# Patient Record
Sex: MALE | Race: WHITE | Employment: FULL TIME | URBAN - NONMETROPOLITAN AREA
[De-identification: names, ages, dates, MRNs, and addresses within clinical notes are randomized per-mention and may not be internally consistent; named-entity substitution may affect disease eponyms.]

---

## 2024-01-04 LAB — PROSTATE SPECIFIC ANTIGEN: 5.5 NG/ML

## 2024-01-17 ENCOUNTER — OFFICE VISIT (OUTPATIENT)
Dept: UROLOGY | Age: 59
End: 2024-01-17
Payer: COMMERCIAL

## 2024-01-17 VITALS — HEIGHT: 73 IN | WEIGHT: 198 LBS | RESPIRATION RATE: 16 BRPM | BODY MASS INDEX: 26.24 KG/M2

## 2024-01-17 DIAGNOSIS — R97.20 ELEVATED PSA: ICD-10-CM

## 2024-01-17 DIAGNOSIS — N40.0 BENIGN PROSTATIC HYPERPLASIA WITHOUT LOWER URINARY TRACT SYMPTOMS: Primary | ICD-10-CM

## 2024-01-17 PROCEDURE — 99204 OFFICE O/P NEW MOD 45 MIN: CPT | Performed by: UROLOGY

## 2024-01-17 RX ORDER — SILODOSIN 8 MG/1
8 CAPSULE ORAL EVERY EVENING
COMMUNITY

## 2024-01-17 NOTE — PROGRESS NOTES
Dr. Rey Marin MD  [facility]  Urology Clinic  New Patient Visit      Patient:  Cosme Hammond  YOB: 1965  Date: 1/17/2024    HISTORY OF PRESENT ILLNESS:   The patient is a 58 y.o. male who presents today for evaluation of the following problem(s): elevated PSA of 5.5 in early 2024, and BPH on rapaflo (but has stuffy nose). AUA-SS of 26 on Rapaflo.     Overall the problem(s) : are worsening.  Associated Symptoms: No dysuria, gross hematuria.  Pain Severity: 0/10    Summary of old records:   None    Imaging/Labs reviewed during today's visit:  I have independently reviewed and verified the following films during today's visit.  PSA 5.5    Last several PSA's:  Lab Results   Component Value Date    PSA 5.50 01/04/2024       Last total testosterone:  No results found for: \"TESTOSTERONE\"    Urinalysis today:  No results found for this visit on 01/17/24.      Last BUN and creatinine:  No results found for: \"BUN\"  No results found for: \"CREATININE\"    Imaging Reviewed during this Office Visit:   (results were independently reviewed by physician and radiology report verified)    PAST MEDICAL, FAMILY AND SOCIAL HISTORY:  No past medical history on file.  Past Surgical History:   Procedure Laterality Date    COLONOSCOPY  2016    HERNIA REPAIR      KNEE SURGERY Right     VASECTOMY       No family history on file.  Outpatient Medications Marked as Taking for the 1/17/24 encounter (Office Visit) with Rey Marin MD   Medication Sig Dispense Refill    Metoprolol Succinate 100 MG CS24 Take by mouth daily      silodosin (RAPAFLO) 8 MG CAPS Take 1 capsule by mouth every evening      Cholecalciferol (VITAMIN D-3 PO) Take by mouth daily         Patient has no known allergies.  Social History     Tobacco Use   Smoking Status Never   Smokeless Tobacco Never       Social History     Substance and Sexual Activity   Alcohol Use None       REVIEW OF SYSTEMS:  Constitutional: negative  Eyes: negative  Respiratory:

## 2024-02-12 ENCOUNTER — HOSPITAL ENCOUNTER (OUTPATIENT)
Dept: MRI IMAGING | Age: 59
Discharge: HOME | End: 2024-02-12
Attending: UROLOGY
Payer: COMMERCIAL

## 2024-02-12 DIAGNOSIS — R97.20 ELEVATED PSA: ICD-10-CM

## 2024-02-12 DIAGNOSIS — N40.0 BENIGN PROSTATIC HYPERPLASIA WITHOUT LOWER URINARY TRACT SYMPTOMS: ICD-10-CM

## 2024-02-12 LAB — POC CREATININE WHOLE BLOOD: 1 MG/DL (ref 0.5–1.2)

## 2024-02-12 PROCEDURE — 72197 MRI PELVIS W/O & W/DYE: CPT

## 2024-02-12 PROCEDURE — 6360000004 HC RX CONTRAST MEDICATION: Performed by: UROLOGY

## 2024-02-12 PROCEDURE — 82565 ASSAY OF CREATININE: CPT

## 2024-02-12 PROCEDURE — A9579 GAD-BASE MR CONTRAST NOS,1ML: HCPCS | Performed by: UROLOGY

## 2024-02-12 PROCEDURE — 76377 3D RENDER W/INTRP POSTPROCES: CPT

## 2024-02-12 RX ADMIN — GADOTERIDOL 20 ML: 279.3 INJECTION, SOLUTION INTRAVENOUS at 19:10

## 2024-02-21 ENCOUNTER — OFFICE VISIT (OUTPATIENT)
Dept: UROLOGY | Age: 59
End: 2024-02-21
Payer: COMMERCIAL

## 2024-02-21 VITALS — WEIGHT: 198 LBS | BODY MASS INDEX: 26.24 KG/M2 | HEIGHT: 73 IN | RESPIRATION RATE: 16 BRPM

## 2024-02-21 DIAGNOSIS — N40.0 BENIGN PROSTATIC HYPERPLASIA WITHOUT LOWER URINARY TRACT SYMPTOMS: Primary | ICD-10-CM

## 2024-02-21 DIAGNOSIS — R97.20 ELEVATED PSA: ICD-10-CM

## 2024-02-21 PROCEDURE — 99214 OFFICE O/P EST MOD 30 MIN: CPT | Performed by: UROLOGY

## 2024-02-21 NOTE — PROGRESS NOTES
Dr. Rey Marin MD  [facility]  Urology Clinic  New Patient Visit      Patient:  Cosme Hammond  YOB: 1965  Date: 2/21/2024    HISTORY OF PRESENT ILLNESS:   The patient is a 58 y.o. male who presents today for evaluation of the following problem(s): elevated PSA of 5.5 in early 2024, and BPH on rapaflo (but has stuffy nose). AUA-SS of 26 on Rapaflo.     Overall the problem(s) : are worsening.  Associated Symptoms: No dysuria, gross hematuria.  Pain Severity: 0/10    Summary of old records:   MRI 2024 50g and PIRADS-2 MRI.     Imaging/Labs reviewed during today's visit:  I have independently reviewed and verified the following films during today's visit.  Prostate MRI    Last several PSA's:  Lab Results   Component Value Date    PSA 5.50 01/04/2024       Last total testosterone:  No results found for: \"TESTOSTERONE\"    Urinalysis today:  No results found for this visit on 02/21/24.      Last BUN and creatinine:  No results found for: \"BUN\"  No results found for: \"CREATININE\"    Imaging Reviewed during this Office Visit:   (results were independently reviewed by physician and radiology report verified)    PAST MEDICAL, FAMILY AND SOCIAL HISTORY:  No past medical history on file.  Past Surgical History:   Procedure Laterality Date    COLONOSCOPY  2016    HERNIA REPAIR      KNEE SURGERY Right     VASECTOMY       No family history on file.  Outpatient Medications Marked as Taking for the 2/21/24 encounter (Office Visit) with Rey Marin MD   Medication Sig Dispense Refill    Metoprolol Succinate 100 MG CS24 Take by mouth daily      silodosin (RAPAFLO) 8 MG CAPS Take 1 capsule by mouth every evening      Cholecalciferol (VITAMIN D-3 PO) Take by mouth daily         Patient has no known allergies.  Social History     Tobacco Use   Smoking Status Never   Smokeless Tobacco Never       Social History     Substance and Sexual Activity   Alcohol Use None       REVIEW OF SYSTEMS:  Constitutional:

## 2024-02-22 ENCOUNTER — TELEPHONE (OUTPATIENT)
Dept: UROLOGY | Age: 59
End: 2024-02-22

## 2024-02-23 ENCOUNTER — TELEPHONE (OUTPATIENT)
Dept: UROLOGY | Age: 59
End: 2024-02-23

## 2024-02-23 DIAGNOSIS — Z01.818 PRE-OP TESTING: ICD-10-CM

## 2024-02-23 DIAGNOSIS — R97.20 ELEVATED PSA: ICD-10-CM

## 2024-02-23 DIAGNOSIS — N40.0 BENIGN PROSTATIC HYPERPLASIA WITHOUT LOWER URINARY TRACT SYMPTOMS: Primary | ICD-10-CM

## 2024-02-23 NOTE — TELEPHONE ENCOUNTER
SURGERY SCHEDULING FORM   [address] [address]      Phone *[phone] *[phone]   Surgical Scheduling Direct Line Phone *[phone] Fax *[phone]      Cosme Hammond 1965 male    [address] [place] [address]   Marital Status:          Home Phone: [phone]      Cell Phone:    Telephone Information:   Mobile [phone]          Surgeon: Dr. Marin Surgery Date: 4/10/2024   Time: 12:30pm    Procedure: Cystoscopy, Greenlight Photo Vaporization of Prostate    Diagnosis: BPH     Important Medical History:  In [facility]    Special Inst/Equip: LEONARDO Sylvester conf# [id number]    CPT Codes:    15494  Latex Allergy: No     Cardiac Device:  No    Anesthesia:  General          Admission Type:  Same Day                        Admit Prior to Day of Surgery: No    Case Location:  Main OR            Preadmission Testing:  Phone Call          PAT Date and Time:______________________________________________________    PAT Confirmation #: ______________________________________________________    Post Op Visit: ___________________________________________________________    Need Preop Cardiac Clearance: No    Does Patient have Cardiologist/physician?     none    Surgery Confirmation #: __________________________________________________    : ________________________   Date: __________________________     Insurance Company Name: [facility]

## 2024-02-23 NOTE — TELEPHONE ENCOUNTER
Patient is scheduled for surgery with  on 4/10/2024. Surgery consent to be done on arrival. Patient to do pre op EKG NOW; DO URINE CULTURE AND FASTING LABS ON 3/27/2024; ORDERS MAILED TO PATIENT. Surgery instructions mailed to the patient.     Patient informed an adult over the age of 18 must be with them at the time of surgery, upon discharge and at home for 24 hours after the procedure.    [facility] CONF#[id number] PER JOHN

## 2024-02-23 NOTE — TELEPHONE ENCOUNTER
DO NOT TAKE  FISH OIL, MOBIC, IBUPROFEN, MOTRIN-LIKE DRUGS AND ANY MULTIVITAMINS OR OVER THE COUNTER SUPPLEMENTS 14 DAYS PRIOR TO SURGERY.    MUST HAVE AN ADULT OVER THE AGE OF 18 WITH YOU AT THE TIME OF THE DISCHARGE AND WITH YOU AT HOME AFTER THE PROCEDURE FOR 24 HOURS        Cosme Hammond 1965     Surgical Physician: Dr. Marin      You have been scheduled for the procedure marked below:      Surgery: Cystoscopy, Greenlight Photo Vaporization of Prostate         Date: 4/10/2024     Anesthesia: Anesthesiologist - General     Place of Service: [facility] Second Floor Same Day Surgery         Arrive to same day surgery at:  10:30am  (Surgery time is subject to change)      INSTRUCTIONS AS MARKED BELOW:    1.  DO NOT eat or drink anything after midnight before surgery.  2.  We prefer you shower or bathe with an antibacterial soap (Dial) the morning of surgery.  3  Please bring a current medication list, photo ID and insurance card(s) with you  4. Okay to take Tylenol  5. Take blood pressure or heart medication as directed, if taken in the morning take with a small sip of water  6.The office will call you in 1-2 days after your procedure to schedule a follow up.      DATE SENSITIVE PRE OP TESTING:    TO AVOID YOUR SURGERY BEING CANCELLED DO ON THE DATE LISTED *WALK IN *NO APPOINTMENT.      DO EKG NOW; ORDERS INCLUDED    DO URINE CULTURE AND FASTING LABS ON 3/27/2024        Date: 2/23/2024

## 2024-03-22 ENCOUNTER — PREP FOR PROCEDURE (OUTPATIENT)
Dept: UROLOGY | Age: 59
End: 2024-03-22

## 2024-03-26 LAB
BASOPHILS ABSOLUTE: 0 /ΜL
BASOPHILS RELATIVE PERCENT: 0.4 %
BUN BLDV-MCNC: 18 MG/DL
CALCIUM SERPL-MCNC: 9.4 MG/DL
CHLORIDE BLD-SCNC: 105 MMOL/L
CO2: 26 MMOL/L
CREAT SERPL-MCNC: 0.9 MG/DL
EGFR: >=90
EOSINOPHILS ABSOLUTE: 0.1 /ΜL
EOSINOPHILS RELATIVE PERCENT: 1.5 %
GLUCOSE BLD-MCNC: 80 MG/DL
HCT VFR BLD CALC: 45.9 % (ref 41–53)
HEMOGLOBIN: 15 G/DL (ref 13.5–17.5)
LYMPHOCYTES ABSOLUTE: 1.2 /ΜL
LYMPHOCYTES RELATIVE PERCENT: 15.9 %
MCH RBC QN AUTO: 30.2 PG
MCHC RBC AUTO-ENTMCNC: 32.7 G/DL
MCV RBC AUTO: 92.5 FL
MONOCYTES ABSOLUTE: 0.6 /ΜL
MONOCYTES RELATIVE PERCENT: 7.7 %
NEUTROPHILS ABSOLUTE: 5.5 /ΜL
NEUTROPHILS RELATIVE PERCENT: 74.2 %
PDW BLD-RTO: 12.7 %
PLATELET # BLD: 215 K/ΜL
PMV BLD AUTO: 10.1 FL
POTASSIUM SERPL-SCNC: 4.3 MMOL/L
RBC # BLD: 4.96 10^6/ΜL
SODIUM BLD-SCNC: 140 MMOL/L
WBC # BLD: 7.4 10^3/ML

## 2024-04-01 RX ORDER — SODIUM CHLORIDE 0.9 % (FLUSH) 0.9 %
5-40 SYRINGE (ML) INJECTION PRN
Status: CANCELLED | OUTPATIENT
Start: 2024-04-01

## 2024-04-01 RX ORDER — SODIUM CHLORIDE 0.9 % (FLUSH) 0.9 %
5-40 SYRINGE (ML) INJECTION EVERY 12 HOURS SCHEDULED
Status: CANCELLED | OUTPATIENT
Start: 2024-04-01

## 2024-04-01 RX ORDER — SODIUM CHLORIDE 9 MG/ML
INJECTION, SOLUTION INTRAVENOUS PRN
Status: CANCELLED | OUTPATIENT
Start: 2024-04-01

## 2024-04-02 NOTE — PROGRESS NOTES
PAT call attempted, patient unavailable, left message to please call us back at your earliest convenience; [phone]

## 2024-04-02 NOTE — FLOWSHEET NOTE
Follow all instructions given by your physician  Do not eat or drink anything after midnight prior to surgery(includes water, chewing gum, mints and ice chips)  Sips of water am of surgery with allowed medications  Do not use chewing tobacco after midnight prior to surgery  May brush teeth do not swallow water  Do not smoke, drink alcoholic beverages or use any illicit drugs for 24 hours prior to surgery  Bring insurance info and photo ID  Bring pertinent paperwork with you from Doctor or surgeons's office  Wear clean comfortable, loose-fitting clothing  No make-up, nail polish, jewelry, piercings, or contact lenses to be worn day of surgery  No glue on dentures morning of surgery; you will be asked to remove them for surgery. Case for glasses.  Shower the night before and the morning of surgery with cleansing soap provided or a liquid antibacterial soap, dry with new fresh clean towel after each shower, no lotions, creams or powder.  Clean sheets and pillowcase on bed night before surgery  Bring medications in original bottles, Bring rescue inhalers with you  Bring CPAP/BIPAP machine if you have one ( you may be charged if one is needed in recovery room )  CPAP machine no    Pacemaker no    Our pharmacy has a Meds to Beds program where they will deliver any new prescriptions you may have to your room before you leave. Our Pharmacy will clear it through your insurance; for example (same co pay). This enables you to take your new RX as soon as you need when you get home and avoids stop/wait delays on the way home.  Please have a form of payment with you and have someone designated as your Pharmacy contact with their phone # as you may not feel well or still be under the influence of anesthesia.    Please refer to the SSI-Surgical Site Infection Flyer you hopefully received in the mail-together we can prevent infections; signs and symptoms reviewed.  When discharged be sure you understand how to care for your

## 2024-04-10 ENCOUNTER — HOSPITAL ENCOUNTER (OUTPATIENT)
Age: 59
Setting detail: OUTPATIENT SURGERY
Discharge: HOME | End: 2024-04-10
Attending: UROLOGY | Admitting: UROLOGY
Payer: COMMERCIAL

## 2024-04-10 ENCOUNTER — ANESTHESIA EVENT (OUTPATIENT)
Dept: OPERATING ROOM | Age: 59
End: 2024-04-10
Payer: COMMERCIAL

## 2024-04-10 ENCOUNTER — ANESTHESIA (OUTPATIENT)
Dept: OPERATING ROOM | Age: 59
End: 2024-04-10
Payer: COMMERCIAL

## 2024-04-10 VITALS
DIASTOLIC BLOOD PRESSURE: 87 MMHG | WEIGHT: 186.8 LBS | HEIGHT: 73 IN | BODY MASS INDEX: 24.76 KG/M2 | OXYGEN SATURATION: 96 % | HEART RATE: 62 BPM | SYSTOLIC BLOOD PRESSURE: 144 MMHG | RESPIRATION RATE: 16 BRPM | TEMPERATURE: 97.1 F

## 2024-04-10 DIAGNOSIS — G89.18 POST-OPERATIVE PAIN: Primary | ICD-10-CM

## 2024-04-10 PROCEDURE — 2580000003 HC RX 258: Performed by: UROLOGY

## 2024-04-10 PROCEDURE — 6360000002 HC RX W HCPCS: Performed by: UROLOGY

## 2024-04-10 PROCEDURE — 2709999900 HC NON-CHARGEABLE SUPPLY: Performed by: UROLOGY

## 2024-04-10 PROCEDURE — 7100000000 HC PACU RECOVERY - FIRST 15 MIN: Performed by: UROLOGY

## 2024-04-10 PROCEDURE — 3600000013 HC SURGERY LEVEL 3 ADDTL 15MIN: Performed by: UROLOGY

## 2024-04-10 PROCEDURE — 7100000001 HC PACU RECOVERY - ADDTL 15 MIN: Performed by: UROLOGY

## 2024-04-10 PROCEDURE — 3600000003 HC SURGERY LEVEL 3 BASE: Performed by: UROLOGY

## 2024-04-10 PROCEDURE — 7100000010 HC PHASE II RECOVERY - FIRST 15 MIN: Performed by: UROLOGY

## 2024-04-10 PROCEDURE — 6370000000 HC RX 637 (ALT 250 FOR IP): Performed by: UROLOGY

## 2024-04-10 PROCEDURE — 7100000011 HC PHASE II RECOVERY - ADDTL 15 MIN: Performed by: UROLOGY

## 2024-04-10 PROCEDURE — 3700000000 HC ANESTHESIA ATTENDED CARE: Performed by: UROLOGY

## 2024-04-10 PROCEDURE — 6360000002 HC RX W HCPCS

## 2024-04-10 PROCEDURE — 3700000001 HC ADD 15 MINUTES (ANESTHESIA): Performed by: UROLOGY

## 2024-04-10 PROCEDURE — 2720000010 HC SURG SUPPLY STERILE: Performed by: UROLOGY

## 2024-04-10 RX ORDER — LIDOCAINE HCL/PF 100 MG/5ML
SYRINGE (ML) INJECTION PRN
Status: DISCONTINUED | OUTPATIENT
Start: 2024-04-10 | End: 2024-04-10 | Stop reason: SDUPTHER

## 2024-04-10 RX ORDER — PROPOFOL 10 MG/ML
INJECTION, EMULSION INTRAVENOUS PRN
Status: DISCONTINUED | OUTPATIENT
Start: 2024-04-10 | End: 2024-04-10 | Stop reason: SDUPTHER

## 2024-04-10 RX ORDER — SODIUM CHLORIDE 0.9 % (FLUSH) 0.9 %
5-40 SYRINGE (ML) INJECTION PRN
Status: DISCONTINUED | OUTPATIENT
Start: 2024-04-10 | End: 2024-04-10 | Stop reason: HOSPADM

## 2024-04-10 RX ORDER — FENTANYL CITRATE 50 UG/ML
INJECTION, SOLUTION INTRAMUSCULAR; INTRAVENOUS PRN
Status: DISCONTINUED | OUTPATIENT
Start: 2024-04-10 | End: 2024-04-10 | Stop reason: SDUPTHER

## 2024-04-10 RX ORDER — TRAMADOL HYDROCHLORIDE 50 MG/1
50 TABLET ORAL EVERY 6 HOURS PRN
Qty: 10 TABLET | Refills: 0 | Status: SHIPPED | OUTPATIENT
Start: 2024-04-10 | End: 2024-04-20

## 2024-04-10 RX ORDER — TRAMADOL HYDROCHLORIDE 50 MG/1
50 TABLET ORAL ONCE
Status: COMPLETED | OUTPATIENT
Start: 2024-04-10 | End: 2024-04-10

## 2024-04-10 RX ORDER — SODIUM CHLORIDE 0.9 % (FLUSH) 0.9 %
5-40 SYRINGE (ML) INJECTION EVERY 12 HOURS SCHEDULED
Status: DISCONTINUED | OUTPATIENT
Start: 2024-04-10 | End: 2024-04-10 | Stop reason: HOSPADM

## 2024-04-10 RX ORDER — SODIUM CHLORIDE 9 MG/ML
INJECTION, SOLUTION INTRAVENOUS PRN
Status: DISCONTINUED | OUTPATIENT
Start: 2024-04-10 | End: 2024-04-10 | Stop reason: HOSPADM

## 2024-04-10 RX ORDER — HYOSCYAMINE SULFATE 0.125 MG
125 TABLET,DISINTEGRATING ORAL ONCE
Status: COMPLETED | OUTPATIENT
Start: 2024-04-10 | End: 2024-04-10

## 2024-04-10 RX ORDER — KETOROLAC TROMETHAMINE 30 MG/ML
INJECTION, SOLUTION INTRAMUSCULAR; INTRAVENOUS PRN
Status: DISCONTINUED | OUTPATIENT
Start: 2024-04-10 | End: 2024-04-10 | Stop reason: SDUPTHER

## 2024-04-10 RX ORDER — ONDANSETRON 2 MG/ML
INJECTION INTRAMUSCULAR; INTRAVENOUS PRN
Status: DISCONTINUED | OUTPATIENT
Start: 2024-04-10 | End: 2024-04-10 | Stop reason: SDUPTHER

## 2024-04-10 RX ORDER — DEXAMETHASONE SODIUM PHOSPHATE 10 MG/ML
INJECTION, EMULSION INTRAMUSCULAR; INTRAVENOUS PRN
Status: DISCONTINUED | OUTPATIENT
Start: 2024-04-10 | End: 2024-04-10 | Stop reason: SDUPTHER

## 2024-04-10 RX ORDER — CEPHALEXIN 500 MG/1
500 CAPSULE ORAL 2 TIMES DAILY
Qty: 6 CAPSULE | Refills: 0 | Status: SHIPPED | OUTPATIENT
Start: 2024-04-10 | End: 2024-04-13

## 2024-04-10 RX ADMIN — SODIUM CHLORIDE: 9 INJECTION, SOLUTION INTRAVENOUS at 11:04

## 2024-04-10 RX ADMIN — HYOSCYAMINE SULFATE 125 MCG: 0.12 TABLET, ORALLY DISINTEGRATING ORAL at 14:19

## 2024-04-10 RX ADMIN — KETOROLAC TROMETHAMINE 30 MG: 30 INJECTION INTRAMUSCULAR; INTRAVENOUS at 12:33

## 2024-04-10 RX ADMIN — WATER 2000 MG: 1 INJECTION INTRAMUSCULAR; INTRAVENOUS; SUBCUTANEOUS at 12:00

## 2024-04-10 RX ADMIN — FENTANYL CITRATE 50 MCG: 50 INJECTION, SOLUTION INTRAMUSCULAR; INTRAVENOUS at 11:56

## 2024-04-10 RX ADMIN — Medication 100 MG: at 11:56

## 2024-04-10 RX ADMIN — FENTANYL CITRATE 50 MCG: 50 INJECTION, SOLUTION INTRAMUSCULAR; INTRAVENOUS at 12:07

## 2024-04-10 RX ADMIN — PROPOFOL 150 MG: 10 INJECTION, EMULSION INTRAVENOUS at 11:56

## 2024-04-10 RX ADMIN — TRAMADOL HYDROCHLORIDE 50 MG: 50 TABLET ORAL at 14:45

## 2024-04-10 RX ADMIN — ONDANSETRON 4 MG: 2 INJECTION INTRAMUSCULAR; INTRAVENOUS at 11:56

## 2024-04-10 RX ADMIN — DEXAMETHASONE SODIUM PHOSPHATE 10 MG: 10 INJECTION, EMULSION INTRAMUSCULAR; INTRAVENOUS at 11:56

## 2024-04-10 ASSESSMENT — PAIN SCALES - GENERAL
PAINLEVEL_OUTOF10: 6
PAINLEVEL_OUTOF10: 7
PAINLEVEL_OUTOF10: 7
PAINLEVEL_OUTOF10: 6

## 2024-04-10 ASSESSMENT — PAIN DESCRIPTION - DESCRIPTORS
DESCRIPTORS: ACHING
DESCRIPTORS: ACHING
DESCRIPTORS: BURNING;CRAMPING
DESCRIPTORS: DISCOMFORT

## 2024-04-10 ASSESSMENT — PAIN DESCRIPTION - ORIENTATION
ORIENTATION: MID
ORIENTATION: LOWER;MID
ORIENTATION: DISTAL
ORIENTATION: MID;LOWER

## 2024-04-10 ASSESSMENT — PAIN - FUNCTIONAL ASSESSMENT: PAIN_FUNCTIONAL_ASSESSMENT: ACTIVITIES ARE NOT PREVENTED

## 2024-04-10 ASSESSMENT — PAIN DESCRIPTION - LOCATION
LOCATION: PENIS

## 2024-04-10 ASSESSMENT — PAIN DESCRIPTION - FREQUENCY: FREQUENCY: INTERMITTENT

## 2024-04-10 ASSESSMENT — PAIN DESCRIPTION - PAIN TYPE
TYPE: SURGICAL PAIN
TYPE: SURGICAL PAIN

## 2024-04-10 NOTE — PROGRESS NOTES
Up to the bathroom. Unable to have BM. Tolerated well. Back to room in bed. Side rails up. Call light in reach

## 2024-04-10 NOTE — ANESTHESIA POSTPROCEDURE EVALUATION
Department of Anesthesiology  Postprocedure Note    Patient: Cosme Hammond  MRN: [id number]  YOB: 1965  Date of evaluation: 4/10/2024    Procedure Summary       Date: 04/10/24 [facility] / [facility]    Anesthesia Start: 1154 Anesthesia Stop: 1242    Procedure: Cystoscopy Greenlight Photo Vaporization of Prostate Diagnosis:       Benign prostatic hyperplasia, unspecified whether lower urinary tract symptoms present      (Benign prostatic hyperplasia, unspecified whether lower urinary tract symptoms present [N40.0])    Surgeons: Rey Marin MD Responsible Provider: Adan Castillo DO    Anesthesia Type: General ASA Status: 2            Anesthesia Type: General    Catalina Phase I: Catalina Score: 4    Catalina Phase II: Catalina Score: 10    Anesthesia Post Evaluation    Patient location during evaluation: PACU  Patient participation: complete - patient participated  Level of consciousness: awake  Airway patency: patent  Nausea & Vomiting: no nausea  Cardiovascular status: hemodynamically stable  Respiratory status: acceptable  Hydration status: stable  Pain management: adequate    No notable events documented.

## 2024-04-10 NOTE — PROGRESS NOTES
Patient admitted to [facility] room 18 with wife at bedside. Bed in low position side rails up call light in reach. Patient denies questions at this time.

## 2024-04-10 NOTE — H&P
Dr. Rey Marin MD  [facility]  Urology Clinic  New Patient Visit      Patient:  Cosme Hammond  YOB: 1965  Date: 4/10/2024    HISTORY OF PRESENT ILLNESS:   The patient is a 58 y.o. male who presents today for evaluation of the following problem(s): elevated PSA of 5.5 in early 2024, and BPH on rapaflo (but has stuffy nose). AUA-SS of 26 on Rapaflo.     Overall the problem(s) : are worsening.  Associated Symptoms: No dysuria, gross hematuria.  Pain Severity: 0/10    Summary of old records:   MRI 2024 50g and PIRADS-2 MRI.     Imaging/Labs reviewed during today's visit:  I have independently reviewed and verified the following films during today's visit.  Prostate MRI    Last several PSA's:  Lab Results   Component Value Date    PSA 5.50 01/04/2024       Last total testosterone:  No results found for: \"TESTOSTERONE\"    Urinalysis today:  No results found for this visit on 02/21/24.      Last BUN and creatinine:  Lab Results   Component Value Date    BUN 18 03/26/2024     Lab Results   Component Value Date    CREATININE 0.9 03/26/2024       Imaging Reviewed during this Office Visit:   (results were independently reviewed by physician and radiology report verified)    PAST MEDICAL, FAMILY AND SOCIAL HISTORY:  Past Medical History:   Diagnosis Date    Arthritis     right knee    Hyperlipidemia     Hypertension     PONV (postoperative nausea and vomiting)      Past Surgical History:   Procedure Laterality Date    COLONOSCOPY  2016    HERNIA REPAIR  2000    KNEE SURGERY Right 1992    TONSILLECTOMY  1995    VASECTOMY  2000     Family History   Problem Relation Age of Onset    No Known Problems Mother     No Known Problems Father      No outpatient medications have been marked as taking for the 4/10/24 encounter (Hospital Encounter).       Patient has no known allergies.  Social History     Tobacco Use   Smoking Status Never   Smokeless Tobacco Never       Social History     Substance and Sexual Activity

## 2024-04-10 NOTE — OP NOTE
Dr. Rey Marin MD  Urologic Surgery        [place], [place]. [facility] [[email or web address]]  04/10/24    Patient:  Cosme Hammond  MRN: [id number]  YOB: 1965    Surgeon: Dr. Rey Marin MD  Assistant: None    Pre-op Diagnosis: BPH  Post-op Diagnosis: Same    Procedure:   Cystoscopy with Greenlight Photovaporization of the Prostate.     Anesthesia: General  Complications: None  OR Blood Loss: Minimal  Fluids: Cystalloids  Implants: 22F 3-way Robertson Catheter  Specimens: None    Indication:  Pleasant 58-year-old male with severe BPH symptoms.  Cystoscopy demonstrated complete obstruction.  We discussed options for treatment.  After risks and benefits were explained he elected to proceed with today's procedure    Narrative of the Procedure:    After informed consent was obtained in the preoperative area, the patient was taken back to the operating room and transferred to the operating table in supine position.  EPC cuffs were placed. The machine was turned on.  Anesthesia was induced and antibiotics were given.  The patient was placed in modified dorsal lithotomy position and sterilely prepped and draped in a standard fashion.  A timeout occurred.  Two patient identifiers were used.  The 25F rigid scope with the visual obturator was carefully advanced through the urethra. Once within the bladder the visual obturator was exchanged for the resection bridge. The ureteral orifices were located and noted to be remote from the bladder neck. The prostate was surveyed and the lateral lobes were noted to be completely obstructing. There was not median lobe present. The resection was started proximal with a power setting of 80W. Both the left and right lateral lobes were vaporized in their entirety down to the level of the surgical capsul. With this complete, the apical dissection was carried out delicately. All of the obstructing adenoma was vaporized. Exquisite care was taken to ensure the integrity of the urinary

## 2024-04-10 NOTE — DISCHARGE INSTRUCTIONS
Robertson catheter can be removed tomorrow.  There is 30 cc in the balloon.  Regular diet.  No heavy lifting for 2 weeks.

## 2024-04-10 NOTE — PROGRESS NOTES
1239 Non reactive on arrival to PACU with spontaneous resp , LMA and NC to LMA   1250 remains non reactive   1258 eyes open and looking around , pt has sustained head lift and strong hand grasp , LMA removed and O2 off , alert and oriented , denies any pain but c/o burning at hauser site and needing to urinate, ice applied to penis  1315 pt unchanged , burning in penis remains , able to rest on and off   1320 meets criteria for discharge , transported to [facility]

## 2024-04-10 NOTE — PROGRESS NOTES

## 2024-04-10 NOTE — ANESTHESIA PRE PROCEDURE
Department of Anesthesiology  Preprocedure Note       Name:  Cosme Hammond   Age:  58 y.o.  :  1965                                          MRN:  [id number]         Date:  4/10/2024      Surgeon: Surgeon(s):  Rey Marin MD    Procedure: Procedure(s):  Cystoscopy Greenlight Photo Vaporization of Prostate    Medications prior to admission:   Prior to Admission medications    Medication Sig Start Date End Date Taking? Authorizing Provider   Metoprolol Succinate 100 MG CS24 Take by mouth daily    Rodriguez Seals MD   silodosin (RAPAFLO) 8 MG CAPS Take 1 capsule by mouth every evening    Rodriguez Seals MD   Cholecalciferol (VITAMIN D-3 PO) Take by mouth daily  Patient not taking: Reported on 2024    ProviderRodriguez MD       Current medications:    Current Facility-Administered Medications   Medication Dose Route Frequency Provider Last Rate Last Admin   • sodium chloride flush 0.9 % injection 5-40 mL  5-40 mL IntraVENous 2 times per day Rey Marin MD       • sodium chloride flush 0.9 % injection 5-40 mL  5-40 mL IntraVENous PRN Rey Marin MD       • 0.9 % sodium chloride infusion   IntraVENous PRN Rey Marin  mL/hr at 04/10/24 1104 New Bag at 04/10/24 1104   • ceFAZolin (ANCEF) 2,000 mg in sterile water 20 mL IV syringe  2,000 mg IntraVENous On Call to OR Rey Marin MD           Allergies:  No Known Allergies    Problem List:  There is no problem list on file for this patient.      Past Medical History:        Diagnosis Date   • Arthritis     right knee   • Hyperlipidemia    • Hypertension    • PONV (postoperative nausea and vomiting)        Past Surgical History:        Procedure Laterality Date   • COLONOSCOPY     • HERNIA REPAIR     • KNEE SURGERY Right    • TONSILLECTOMY     • VASECTOMY         Social History:    Social History     Tobacco Use   • Smoking status: Never   • Smokeless tobacco: Never   Substance Use Topics   • Alcohol use: Not

## 2024-04-10 NOTE — PROGRESS NOTES
Pt returned to [facility] room 18. Vitals and assessment as charted. 0.9 infusing, @900ml to count from PACU. Pt has has snack and drink. Family at the bedside. Pt and family verbalized understanding of discharge criteria and call light use. Call light in reach.

## 2024-04-11 ENCOUNTER — NURSE ONLY (OUTPATIENT)
Dept: UROLOGY | Age: 59
End: 2024-04-11

## 2024-04-11 DIAGNOSIS — N13.8 BENIGN PROSTATIC HYPERPLASIA WITH URINARY OBSTRUCTION: ICD-10-CM

## 2024-04-11 DIAGNOSIS — N40.0 BENIGN PROSTATIC HYPERPLASIA WITHOUT LOWER URINARY TRACT SYMPTOMS: Primary | ICD-10-CM

## 2024-04-11 DIAGNOSIS — N40.1 BENIGN PROSTATIC HYPERPLASIA WITH URINARY OBSTRUCTION: ICD-10-CM

## 2024-04-11 PROCEDURE — 90000 NO LOS: CPT | Performed by: NURSE PRACTITIONER

## 2024-04-11 NOTE — PROGRESS NOTES
Patient has given me verbal consent to perform hauser removal  Yes    DIAGNOSIS/INDICATION:  BPH    Per Salima Woodson APRN 30 cc of water deflated from hauser balloon. 22 Fr straight hauser removed without difficulty.    Foreskin reduced back down?  N/A      Pt will drink fluids and call office before 3PM or report to ER in 6-8 hours if patient unable to urinate.      F/u with provider as scheduled on 5/9/24.

## 2024-04-16 ENCOUNTER — TELEPHONE (OUTPATIENT)
Dept: UROLOGY | Age: 59
End: 2024-04-16

## 2024-04-16 RX ORDER — SILODOSIN 8 MG/1
8 CAPSULE ORAL EVERY EVENING
Qty: 30 CAPSULE | Refills: 0 | Status: SHIPPED | OUTPATIENT
Start: 2024-04-16 | End: 2024-05-16

## 2024-04-16 NOTE — TELEPHONE ENCOUNTER
I have personally verified, reviewed, and approved these actions.    How much activity is safe?    Go home and rest the first day.     Return to your usual activities slowly. Usually you can do most of your usual activities after 1 week.    You can take walks after 1 week.    Complete healing may take about 4 to 6 weeks.      Sexual activity    Do not have sex for 2 to 3 weeks.     You may have problems ejaculating. Your semen might flow into the bladder instead of the penis (retrograde ejaculation). This is common and not harmful.     For the first 3 to 4 weeks after your surgery.   Do NOT lift anything more than 10 pounds (5 kilograms). For example, do not carry groceries, small children or pets   Do NOT do heavy exercise or activities such as shoveling snow, gardening and cutting grass, jogging, golfing or skiing.    Do NOT swim.    If you are taking long car trips, make frequent stops to urinate and take breaks.   You can resume work 4 weeks following the procedure     When can I shower or bathe?   You can take a shower 24 hours after your surgery. You can shower even if you have a Robertson catheter.     Silodosin refilled. Discuss continuation at follow-up  Urinary symptoms may take 3-9 months to improve.

## 2024-04-16 NOTE — TELEPHONE ENCOUNTER
Patient is almost out of silodosin. The stream is good. Can he stop the medication or should he refill?    How long should he refrain from sexual intercourse and refrain from heavy lifting. Advised 4-6 weeks and at that point 6 week he can slowly introduce activity.    Advised if he notices blood in the urine he is doing to much activity and needs to back off activity.    He still gets up quite a bit at night. He feels like he is emptying the bladder.    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 How long should he be off work?    Follow up on 05/09/2024

## 2024-05-09 ENCOUNTER — OFFICE VISIT (OUTPATIENT)
Dept: UROLOGY | Age: 59
End: 2024-05-09
Payer: COMMERCIAL

## 2024-05-09 VITALS
HEIGHT: 73 IN | WEIGHT: 189.9 LBS | BODY MASS INDEX: 25.17 KG/M2 | DIASTOLIC BLOOD PRESSURE: 88 MMHG | SYSTOLIC BLOOD PRESSURE: 132 MMHG

## 2024-05-09 DIAGNOSIS — R97.20 ELEVATED PSA: ICD-10-CM

## 2024-05-09 DIAGNOSIS — N40.1 BENIGN PROSTATIC HYPERPLASIA WITH URINARY OBSTRUCTION: Primary | ICD-10-CM

## 2024-05-09 DIAGNOSIS — N13.8 BENIGN PROSTATIC HYPERPLASIA WITH URINARY OBSTRUCTION: Primary | ICD-10-CM

## 2024-05-09 LAB
BILIRUBIN, URINE: NEGATIVE
BLOOD URINE, POC: ABNORMAL
CHARACTER, URINE: CLEAR
COLOR: YELLOW
GLUCOSE URINE: NEGATIVE MG/DL
KETONES, URINE: NEGATIVE
LEUKOCYTE CLUMPS, URINE: ABNORMAL
NITRITE, URINE: NEGATIVE
PH, URINE: 5.5 (ref 5–9)
POST VOID RESIDUAL (PVR): 0 ML
PROTEIN, URINE: NEGATIVE MG/DL
SPECIFIC GRAVITY UA: 1.01 (ref 1–1.03)
UROBILINOGEN, URINE: 0.2 EU/DL (ref 0–1)

## 2024-05-09 PROCEDURE — 51798 US URINE CAPACITY MEASURE: CPT

## 2024-05-09 PROCEDURE — 99024 POSTOP FOLLOW-UP VISIT: CPT

## 2024-05-09 PROCEDURE — 81003 URINALYSIS AUTO W/O SCOPE: CPT

## 2024-05-09 NOTE — PROGRESS NOTES
[facility] [place] [facility]  [facility]  [address]  [address]  [facility] [address]  Dept: [phone]  Loc: [phone]  Visit Date: 5/9/2024      HPI  Cosme Hammond is a 58 y.o. male that presents to the urology clinic for post-op check.    S/P Cystoscopy, Greenlight Photovaporization of the Prostate by Dr. Marin on 4/10/24. On Rapaflo. Stream is much improved. Nocturia down to 1-2 x per night. Nocturia x 6 prior to surgery. Irritative voiding symptoms are resolving. Patient denies dysuria.      Most Recent PSA: 5.50   (01/04/24)  OV Labs  Results for POC orders placed in visit on 05/09/24   POCT Urinalysis No Micro (Auto)   Result Value Ref Range    Glucose, Ur Negative NEGATIVE mg/dl    Bilirubin, Urine Negative     Ketones, Urine Negative NEGATIVE    Specific Gravity, UA 1.015 1.002 - 1.030    Blood, UA POC Large (A) NEGATIVE    pH, Urine 5.50 5.0 - 9.0    Protein, Urine Negative NEGATIVE mg/dl    Urobilinogen, Urine 0.20 0.0 - 1.0 eu/dl    Nitrite, Urine Negative NEGATIVE    Leukocyte Clumps, Urine Moderate (A) NEGATIVE    Color, UA Yellow YELLOW-STRAW    Character, Urine Clear [id number].[id number]   poct post void residual   Result Value Ref Range    post void residual 0 ml           Last BUN and Creatinine:  Lab Results   Component Value Date    BUN 18 03/26/2024     Lab Results   Component Value Date    CREATININE 0.9 03/26/2024           PAST MEDICAL, FAMILY AND SOCIAL HISTORY UPDATE:  Past Medical History:   Diagnosis Date    Arthritis     right knee    Hyperlipidemia     Hypertension     PONV (postoperative nausea and vomiting)      Past Surgical History:   Procedure Laterality Date    COLONOSCOPY  2016    HERNIA REPAIR  2000    KNEE SURGERY Right 1992    TONSILLECTOMY  1995    TURP N/A 4/10/2024    Cystoscopy Greenlight Photo Vaporization of Prostate performed by Rey Marin MD at [facility] OR    VASECTOMY  2000     Family History   Problem Relation Age of Onset    No Known

## 2024-05-09 NOTE — PATIENT INSTRUCTIONS
Hold off on mowing until 6 weeks post-op. No strenuous activity or heavy lifting (no to exceed 20 lbs).

## 2024-05-13 RX ORDER — SILODOSIN 8 MG/1
CAPSULE ORAL
Qty: 30 CAPSULE | Refills: 0 | Status: SHIPPED | OUTPATIENT
Start: 2024-05-13

## 2024-05-13 NOTE — TELEPHONE ENCOUNTER
Cosme Hammond called requesting a refill on the following medications:  Requested Prescriptions     Pending Prescriptions Disp Refills    silodosin (RAPAFLO) 8 MG CAPS [Pharmacy Med Name: Silodosin 8 MG Oral Capsule] 30 capsule 0     Sig: TAKE 1 CAPSULE BY MOUTH ONCE DAILY IN THE EVENING     Pharmacy verified:  [email or web address]      Date of last visit: 05/09/2024  Date of next visit (if applicable): 8/21/2024

## 2024-06-07 ENCOUNTER — TELEPHONE (OUTPATIENT)
Dept: UROLOGY | Age: 59
End: 2024-06-07

## 2024-06-07 DIAGNOSIS — R30.0 DYSURIA: Primary | ICD-10-CM

## 2024-06-07 NOTE — TELEPHONE ENCOUNTER
Can check urine    Otherwise follow-up as scheduled    The patient should go to the ED should they develop fever, chills, nausea, vomiting, chest pain, SOB, calf pain, feelings of incomplete emptying, or should they otherwise feel they need evaluated

## 2024-06-11 NOTE — TELEPHONE ENCOUNTER
Urine culture negative.  Still complaining of pain at end of stream.    Still having nocturia.      Encouraged to start azo and d mannose.    Appointment for 6/18 with azam to discuss situation.

## 2024-06-18 ENCOUNTER — OFFICE VISIT (OUTPATIENT)
Dept: UROLOGY | Age: 59
End: 2024-06-18
Payer: COMMERCIAL

## 2024-06-18 VITALS — WEIGHT: 190 LBS | RESPIRATION RATE: 20 BRPM | HEIGHT: 73 IN | BODY MASS INDEX: 25.18 KG/M2

## 2024-06-18 DIAGNOSIS — N40.1 BENIGN PROSTATIC HYPERPLASIA WITH URINARY OBSTRUCTION: ICD-10-CM

## 2024-06-18 DIAGNOSIS — N34.2 URETHRITIS: ICD-10-CM

## 2024-06-18 DIAGNOSIS — R30.0 DYSURIA: Primary | ICD-10-CM

## 2024-06-18 DIAGNOSIS — N13.8 BENIGN PROSTATIC HYPERPLASIA WITH URINARY OBSTRUCTION: ICD-10-CM

## 2024-06-18 DIAGNOSIS — R97.20 ELEVATED PSA: ICD-10-CM

## 2024-06-18 LAB
BILIRUBIN, URINE: NEGATIVE
BLOOD URINE, POC: ABNORMAL
CHARACTER, URINE: CLEAR
COLOR: YELLOW
GLUCOSE URINE: NEGATIVE MG/DL
KETONES, URINE: NEGATIVE
LEUKOCYTE CLUMPS, URINE: ABNORMAL
NITRITE, URINE: NEGATIVE
PH, URINE: 6 (ref 5–9)
PROTEIN, URINE: 100 MG/DL
SPECIFIC GRAVITY UA: >= 1.03 (ref 1–1.03)
UROBILINOGEN, URINE: 0.2 EU/DL (ref 0–1)

## 2024-06-18 PROCEDURE — 81003 URINALYSIS AUTO W/O SCOPE: CPT

## 2024-06-18 PROCEDURE — 99213 OFFICE O/P EST LOW 20 MIN: CPT

## 2024-06-18 RX ORDER — DOXYCYCLINE HYCLATE 100 MG
100 TABLET ORAL 2 TIMES DAILY
Qty: 28 TABLET | Refills: 0 | Status: SHIPPED | OUTPATIENT
Start: 2024-06-18 | End: 2024-07-02

## 2024-06-18 NOTE — PROGRESS NOTES
[facility] [place] [facility]  [facility] UROLOGY  [address]  [address]  [facility] [address]  Dept: [phone]  Loc: [phone]  Visit Date: 6/18/2024      HPI  Cosme Hammond is a 58 y.o. male that presents to the urology clinic for post-op check.    S/P Cystoscopy, Greenlight Photovaporization of the Prostate by Dr. Marin on 4/10/24. On Rapaflo. Stream is much improved following surgery. Nocturia down to 1-2 x per night. Nocturia x 6 prior to surgery.    Dysuria onset over the last week and is primarily localized to the distal portion of the urethra. Pain is mild at a 3/10 but is bothersome.    Most Recent PSA: 5.50 (01/04/24)    OV Labs  Results for POC orders placed in visit on 06/18/24   POCT Urinalysis No Micro (Auto)   Result Value Ref Range    Glucose, Ur Negative NEGATIVE mg/dl    Bilirubin, Urine Negative     Ketones, Urine Negative NEGATIVE    Specific Gravity, UA >=1.030 1.002 - 1.030    Blood, UA POC Moderate (A) NEGATIVE    pH, Urine 6.00 5.0 - 9.0    Protein, Urine 100 (A) NEGATIVE mg/dl    Urobilinogen, Urine 0.20 0.0 - 1.0 eu/dl    Nitrite, Urine Negative NEGATIVE    Leukocyte Clumps, Urine Small (A) NEGATIVE    Color, UA Yellow YELLOW-STRAW    Character, Urine Clear [id number].[id number]           Last BUN and Creatinine:  Lab Results   Component Value Date    BUN 18 03/26/2024     Lab Results   Component Value Date    CREATININE 0.9 03/26/2024           PAST MEDICAL, FAMILY AND SOCIAL HISTORY UPDATE:  Past Medical History:   Diagnosis Date    Arthritis     right knee    Hyperlipidemia     Hypertension     PONV (postoperative nausea and vomiting)      Past Surgical History:   Procedure Laterality Date    COLONOSCOPY  2016    HERNIA REPAIR  2000    KNEE SURGERY Right 1992    TONSILLECTOMY  1995    TURP N/A 4/10/2024    Cystoscopy Greenlight Photo Vaporization of Prostate performed by Rey Marin MD at [facility] OR    VASECTOMY  2000     Family History   Problem Relation Age of Onset

## 2024-06-27 RX ORDER — SILODOSIN 8 MG/1
CAPSULE ORAL
Qty: 30 CAPSULE | Refills: 0 | Status: SHIPPED | OUTPATIENT
Start: 2024-06-27

## 2024-06-27 NOTE — TELEPHONE ENCOUNTER
S/P Cystoscopy, Greenlight Photovaporization of the Prostate by Dr. Marin on 4/10/24     Patient c/o the following    At the end of the stream he has some pain and pressure feels more like clamping. Pain rated 3 on scale of 0-10. He does have increase urgency and frequency at times.      He is emptying out his bladder and the stream is good until the symptoms develop at the end.    He is taking the doxycycline.    He does not know if his symptoms are related to being off the rapaflo,  doing activity with going up steps and to much lifting. He lifts 30-50 pounds.    He did schedule a follow up to discuss with Dr Marin on 07/17/2024.    He would like to know the max dose of rapaflo to take and if he takes it BID will his symptoms be better controlled?

## 2024-06-27 NOTE — TELEPHONE ENCOUNTER
Cosme is requesting a refill of their   Requested Prescriptions     Pending Prescriptions Disp Refills    silodosin (RAPAFLO) 8 MG CAPS 30 capsule 0   . Please advise.      Last Appt:  Visit date not found  Next Appt:   Visit date not found  Preferred pharmacy:   [facility] [address] [place] [address] [phone] - F [phone]     Patient states he would like to go back on this medication he is having issues again.

## 2024-07-23 RX ORDER — SILODOSIN 8 MG/1
CAPSULE ORAL
Qty: 30 CAPSULE | Refills: 0 | Status: SHIPPED | OUTPATIENT
Start: 2024-07-23

## 2024-07-24 RX ORDER — SILODOSIN 8 MG/1
CAPSULE ORAL
Qty: 30 CAPSULE | Refills: 0 | OUTPATIENT
Start: 2024-07-24

## 2024-07-24 NOTE — TELEPHONE ENCOUNTER
Cosme Hammond called requesting a refill on the following medications:  Requested Prescriptions     Pending Prescriptions Disp Refills    silodosin (RAPAFLO) 8 MG CAPS 30 capsule 0     Pharmacy verified:  [email or web address]  [facility] [address] [place] [address] [phone] [facility] [phone]     Date of last visit: 06/18/2024  Date of next visit (if applicable): 8/21/2024

## 2024-08-21 RX ORDER — SILODOSIN 8 MG/1
CAPSULE ORAL
Qty: 30 CAPSULE | Refills: 0 | Status: SHIPPED | OUTPATIENT
Start: 2024-08-21

## 2024-08-21 NOTE — TELEPHONE ENCOUNTER
Cosme Hammond called requesting a refill on the following medications:  Requested Prescriptions     Pending Prescriptions Disp Refills    silodosin (RAPAFLO) 8 MG CAPS 30 capsule 0     Sig: TAKE 1 CAPSULE BY MOUTH ONCE DAILY IN THE EVENING     Pharmacy verified:  [email or web address]      Date of last visit: 06/18/2024  Date of next visit (if applicable): 9/18/2024

## 2024-09-18 ENCOUNTER — OFFICE VISIT (OUTPATIENT)
Dept: UROLOGY | Age: 59
End: 2024-09-18
Payer: COMMERCIAL

## 2024-09-18 DIAGNOSIS — R97.20 ELEVATED PSA: Primary | ICD-10-CM

## 2024-09-18 DIAGNOSIS — N40.1 BENIGN PROSTATIC HYPERPLASIA WITH URINARY OBSTRUCTION: ICD-10-CM

## 2024-09-18 DIAGNOSIS — N13.8 BENIGN PROSTATIC HYPERPLASIA WITH URINARY OBSTRUCTION: ICD-10-CM

## 2024-09-18 LAB
BILIRUBIN, URINE: NEGATIVE
BLOOD URINE, POC: NORMAL
CHARACTER, URINE: CLEAR
COLOR, UA: YELLOW
GLUCOSE URINE: NEGATIVE MG/DL
KETONES, URINE: NEGATIVE
LEUKOCYTE CLUMPS, URINE: NEGATIVE
NITRITE, URINE: NEGATIVE
PH, URINE: 5.5 (ref 5–9)
PROTEIN, URINE: NEGATIVE MG/DL
SPECIFIC GRAVITY UA: 1.02 (ref 1–1.03)
UROBILINOGEN, URINE: 0.2 EU/DL (ref 0–1)

## 2024-09-18 PROCEDURE — 99214 OFFICE O/P EST MOD 30 MIN: CPT | Performed by: UROLOGY

## 2024-09-18 RX ORDER — SILODOSIN 8 MG/1
CAPSULE ORAL
Qty: 30 CAPSULE | Refills: 0 | Status: SHIPPED | OUTPATIENT
Start: 2024-09-18

## 2024-10-21 RX ORDER — SILODOSIN 8 MG/1
CAPSULE ORAL
Qty: 90 CAPSULE | Refills: 1 | Status: SHIPPED | OUTPATIENT
Start: 2024-10-21

## 2024-10-21 NOTE — TELEPHONE ENCOUNTER
Cosme Hammond called requesting a refill on the following medications:  Requested Prescriptions     Pending Prescriptions Disp Refills    silodosin (RAPAFLO) 8 MG CAPS [Pharmacy Med Name: Silodosin 8 MG Oral Capsule] 30 capsule 0     Sig: TAKE 1 CAPSULE BY MOUTH ONCE DAILY IN THE EVENING     Pharmacy verified:  [email or web address]      Date of last visit: 09/18/2024  Date of next visit (if applicable): 3/19/2025

## 2025-03-13 LAB — PROSTATE SPECIFIC ANTIGEN: 1.53 NG/ML

## 2025-04-02 ENCOUNTER — OFFICE VISIT (OUTPATIENT)
Dept: UROLOGY | Age: 60
End: 2025-04-02
Payer: COMMERCIAL

## 2025-04-02 VITALS — HEIGHT: 73 IN | WEIGHT: 199.8 LBS | BODY MASS INDEX: 26.48 KG/M2 | RESPIRATION RATE: 16 BRPM

## 2025-04-02 DIAGNOSIS — N34.2 URETHRITIS: ICD-10-CM

## 2025-04-02 DIAGNOSIS — N52.9 ERECTILE DYSFUNCTION, UNSPECIFIED ERECTILE DYSFUNCTION TYPE: ICD-10-CM

## 2025-04-02 DIAGNOSIS — N40.1 BENIGN PROSTATIC HYPERPLASIA WITH URINARY OBSTRUCTION: ICD-10-CM

## 2025-04-02 DIAGNOSIS — R97.20 ELEVATED PSA: Primary | ICD-10-CM

## 2025-04-02 DIAGNOSIS — N13.8 BENIGN PROSTATIC HYPERPLASIA WITH URINARY OBSTRUCTION: ICD-10-CM

## 2025-04-02 PROCEDURE — 99214 OFFICE O/P EST MOD 30 MIN: CPT | Performed by: UROLOGY

## 2025-04-02 RX ORDER — TADALAFIL 20 MG/1
20 TABLET ORAL DAILY PRN
Qty: 30 TABLET | Refills: 1 | Status: SHIPPED | OUTPATIENT
Start: 2025-04-02

## 2025-04-02 NOTE — PROGRESS NOTES
REVIEW OF SYSTEMS:  Constitutional: negative  Eyes: negative  Respiratory: negative  Cardiovascular: negative  Gastrointestinal: negative  Musculoskeletal: negative  Genitourinary: negative except for what is in HPI  Skin: negative   Neurological: negative  Hematological/Lymphatic: negative  Psychological: negative    Physical Exam:      Vitals:    04/02/25 1313   Resp: 16       Patient is a 59 y.o. male in no acute distress and alert and oriented to person, place and time.    Pulmonary: Non-labored respiration.  Cardiovascular: Normal rate, regular rhythm, normal peripheral pulses.  Skin: Warm and dry.  Psych: Normal mood and affect.  Genitourinary: Bladder non-distended and non-tender.      Assessment and Plan   BPH w/ LUTS  Elevated PSA  3. ED    PSA improved a lot. Doing great.  Nocturia 1x per night down from 6x per night before surgery.   Tadalafil for ED 20mg.   Off all meds.     Dr. Rey Marin MD

## (undated) DEVICE — SYRINGE CATH TIP 50ML

## (undated) DEVICE — SOLUTION IRRIG 1000ML STRL H2O USP PLAS POUR BTL

## (undated) DEVICE — CATHETER URETH 22FR 30CC BLLN F 3 W SPEC M RND TIP TWO

## (undated) DEVICE — CYSTO: Brand: MEDLINE INDUSTRIES, INC.

## (undated) DEVICE — DRAINBAG,ANTI-REFLUX TOWER,L/F,2000ML,LL: Brand: MEDLINE

## (undated) DEVICE — LASER SURG W22XH58IN D36IN 475LB SLD STATE FREQ DOUBLED

## (undated) DEVICE — SOLUTION IRRIG 3000ML 0.9% SOD CHL USP UROMATIC PLAS CONT